# Patient Record
Sex: FEMALE | Race: WHITE | NOT HISPANIC OR LATINO | Employment: FULL TIME | URBAN - METROPOLITAN AREA
[De-identification: names, ages, dates, MRNs, and addresses within clinical notes are randomized per-mention and may not be internally consistent; named-entity substitution may affect disease eponyms.]

---

## 2017-02-27 ENCOUNTER — OFFICE VISIT - HEALTHEAST (OUTPATIENT)
Dept: FAMILY MEDICINE | Facility: CLINIC | Age: 22
End: 2017-02-27

## 2017-02-27 DIAGNOSIS — N39.0 UTI (URINARY TRACT INFECTION), UNCOMPLICATED: ICD-10-CM

## 2017-02-27 DIAGNOSIS — R30.0 DYSURIA: ICD-10-CM

## 2017-08-08 ENCOUNTER — COMMUNICATION - HEALTHEAST (OUTPATIENT)
Dept: SCHEDULING | Facility: CLINIC | Age: 22
End: 2017-08-08

## 2017-10-24 ENCOUNTER — OFFICE VISIT - HEALTHEAST (OUTPATIENT)
Dept: FAMILY MEDICINE | Facility: CLINIC | Age: 22
End: 2017-10-24

## 2017-10-24 DIAGNOSIS — Z00.00 ROUTINE GENERAL MEDICAL EXAMINATION AT A HEALTH CARE FACILITY: ICD-10-CM

## 2017-10-24 DIAGNOSIS — Z11.3 SCREENING FOR STD (SEXUALLY TRANSMITTED DISEASE): ICD-10-CM

## 2017-10-24 LAB — HIV 1+2 AB+HIV1 P24 AG SERPL QL IA: NEGATIVE

## 2017-10-24 ASSESSMENT — MIFFLIN-ST. JEOR: SCORE: 1337.81

## 2017-10-25 LAB — SYPHILIS RPR SCREEN - HISTORICAL: NORMAL

## 2017-10-30 ENCOUNTER — COMMUNICATION - HEALTHEAST (OUTPATIENT)
Dept: FAMILY MEDICINE | Facility: CLINIC | Age: 22
End: 2017-10-30

## 2017-12-26 ENCOUNTER — COMMUNICATION - HEALTHEAST (OUTPATIENT)
Dept: FAMILY MEDICINE | Facility: CLINIC | Age: 22
End: 2017-12-26

## 2018-02-20 ENCOUNTER — COMMUNICATION - HEALTHEAST (OUTPATIENT)
Dept: FAMILY MEDICINE | Facility: CLINIC | Age: 23
End: 2018-02-20

## 2018-08-15 ENCOUNTER — OFFICE VISIT - HEALTHEAST (OUTPATIENT)
Dept: FAMILY MEDICINE | Facility: CLINIC | Age: 23
End: 2018-08-15
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
## 2018-10-25 ENCOUNTER — OFFICE VISIT - HEALTHEAST (OUTPATIENT)
Dept: FAMILY MEDICINE | Facility: CLINIC | Age: 23
End: 2018-10-25

## 2018-10-25 DIAGNOSIS — Z00.00 ROUTINE GENERAL MEDICAL EXAMINATION AT A HEALTH CARE FACILITY: ICD-10-CM

## 2018-10-25 DIAGNOSIS — Z30.09 BIRTH CONTROL COUNSELING: ICD-10-CM

## 2018-10-25 DIAGNOSIS — Z11.3 SCREENING FOR STD (SEXUALLY TRANSMITTED DISEASE): ICD-10-CM

## 2018-10-25 DIAGNOSIS — D48.5 NEOPLASM OF UNCERTAIN BEHAVIOR OF SKIN: ICD-10-CM

## 2018-10-25 ASSESSMENT — MIFFLIN-ST. JEOR: SCORE: 1317.85

## 2018-10-26 ENCOUNTER — COMMUNICATION - HEALTHEAST (OUTPATIENT)
Dept: FAMILY MEDICINE | Facility: CLINIC | Age: 23
End: 2018-10-26

## 2018-10-26 LAB
C TRACH DNA SPEC QL PROBE+SIG AMP: NEGATIVE
N GONORRHOEA DNA SPEC QL NAA+PROBE: NEGATIVE

## 2018-11-15 ENCOUNTER — COMMUNICATION - HEALTHEAST (OUTPATIENT)
Dept: FAMILY MEDICINE | Facility: CLINIC | Age: 23
End: 2018-11-15

## 2018-12-18 ENCOUNTER — OFFICE VISIT - HEALTHEAST (OUTPATIENT)
Dept: FAMILY MEDICINE | Facility: CLINIC | Age: 23
End: 2018-12-18

## 2018-12-18 DIAGNOSIS — N89.8 VAGINAL DISCHARGE: ICD-10-CM

## 2018-12-18 DIAGNOSIS — N30.00 ACUTE CYSTITIS WITHOUT HEMATURIA: ICD-10-CM

## 2018-12-18 DIAGNOSIS — B96.89 BV (BACTERIAL VAGINOSIS): ICD-10-CM

## 2018-12-18 DIAGNOSIS — N76.0 BV (BACTERIAL VAGINOSIS): ICD-10-CM

## 2018-12-18 DIAGNOSIS — R30.0 DYSURIA: ICD-10-CM

## 2018-12-18 LAB
ALBUMIN UR-MCNC: ABNORMAL MG/DL
APPEARANCE UR: CLEAR
BACTERIA #/AREA URNS HPF: ABNORMAL HPF
BILIRUB UR QL STRIP: ABNORMAL
CLUE CELLS: ABNORMAL
COLOR UR AUTO: YELLOW
GLUCOSE UR STRIP-MCNC: NEGATIVE MG/DL
HGB UR QL STRIP: ABNORMAL
KETONES UR STRIP-MCNC: ABNORMAL MG/DL
LEUKOCYTE ESTERASE UR QL STRIP: ABNORMAL
NITRATE UR QL: NEGATIVE
PH UR STRIP: 6 [PH] (ref 5–8)
RBC #/AREA URNS AUTO: ABNORMAL HPF
SP GR UR STRIP: 1.02 (ref 1–1.03)
SQUAMOUS #/AREA URNS AUTO: >100 LPF
TRICHOMONAS, WET PREP: ABNORMAL
UROBILINOGEN UR STRIP-ACNC: ABNORMAL
WBC #/AREA URNS AUTO: ABNORMAL HPF
YEAST, WET PREP: ABNORMAL

## 2018-12-20 LAB — BACTERIA SPEC CULT: ABNORMAL

## 2019-11-22 ENCOUNTER — OFFICE VISIT - HEALTHEAST (OUTPATIENT)
Dept: FAMILY MEDICINE | Facility: CLINIC | Age: 24
End: 2019-11-22

## 2019-11-22 DIAGNOSIS — Z00.00 ROUTINE GENERAL MEDICAL EXAMINATION AT A HEALTH CARE FACILITY: ICD-10-CM

## 2019-11-22 DIAGNOSIS — Z30.09 BIRTH CONTROL COUNSELING: ICD-10-CM

## 2019-11-22 ASSESSMENT — MIFFLIN-ST. JEOR: SCORE: 1339.63

## 2020-10-10 ENCOUNTER — COMMUNICATION - HEALTHEAST (OUTPATIENT)
Dept: SCHEDULING | Facility: CLINIC | Age: 25
End: 2020-10-10

## 2020-10-10 DIAGNOSIS — Z30.09 BIRTH CONTROL COUNSELING: ICD-10-CM

## 2021-01-22 ENCOUNTER — OFFICE VISIT - HEALTHEAST (OUTPATIENT)
Dept: FAMILY MEDICINE | Facility: CLINIC | Age: 26
End: 2021-01-22

## 2021-01-22 DIAGNOSIS — Z00.00 ROUTINE GENERAL MEDICAL EXAMINATION AT A HEALTH CARE FACILITY: ICD-10-CM

## 2021-01-22 DIAGNOSIS — Z13.220 LIPID SCREENING: ICD-10-CM

## 2021-01-22 DIAGNOSIS — Z13.1 DIABETES MELLITUS SCREENING: ICD-10-CM

## 2021-01-22 DIAGNOSIS — Z30.09 BIRTH CONTROL COUNSELING: ICD-10-CM

## 2021-01-22 LAB
CHOLEST SERPL-MCNC: 143 MG/DL
FASTING STATUS PATIENT QL REPORTED: YES
FASTING STATUS PATIENT QL REPORTED: YES
GLUCOSE BLD-MCNC: 75 MG/DL (ref 70–125)
HDLC SERPL-MCNC: 59 MG/DL
HGB BLD-MCNC: 15.5 G/DL (ref 12–16)
LDLC SERPL CALC-MCNC: 74 MG/DL
TRIGL SERPL-MCNC: 50 MG/DL

## 2021-01-22 RX ORDER — NORGESTIMATE AND ETHINYL ESTRADIOL 7DAYSX3 28
1 KIT ORAL DAILY
Qty: 84 TABLET | Refills: 3 | Status: SHIPPED | OUTPATIENT
Start: 2021-01-22 | End: 2022-01-28

## 2021-01-22 ASSESSMENT — MIFFLIN-ST. JEOR: SCORE: 1315.06

## 2021-01-26 LAB
BKR LAB AP ABNORMAL BLEEDING: NO
BKR LAB AP BIRTH CONTROL/HORMONES: NORMAL
BKR LAB AP CERVICAL APPEARANCE: NORMAL
BKR LAB AP GYN ADEQUACY: NORMAL
BKR LAB AP GYN INTERPRETATION: NORMAL
BKR LAB AP HPV REFLEX: NORMAL
BKR LAB AP LMP: NORMAL
BKR LAB AP PATIENT STATUS: NORMAL
BKR LAB AP PREVIOUS ABNORMAL: NORMAL
BKR LAB AP PREVIOUS NORMAL: 2017
HIGH RISK?: NO
PATH REPORT.COMMENTS IMP SPEC: NORMAL
RESULT FLAG (HE HISTORICAL CONVERSION): NORMAL

## 2021-05-30 VITALS — WEIGHT: 127 LBS

## 2021-05-31 VITALS — WEIGHT: 128.1 LBS | BODY MASS INDEX: 20.59 KG/M2 | HEIGHT: 66 IN

## 2021-06-01 VITALS — WEIGHT: 122 LBS | BODY MASS INDEX: 19.69 KG/M2

## 2021-06-02 VITALS — WEIGHT: 123.7 LBS | HEIGHT: 66 IN | BODY MASS INDEX: 19.88 KG/M2

## 2021-06-02 VITALS — WEIGHT: 125.8 LBS | BODY MASS INDEX: 20.3 KG/M2

## 2021-06-03 NOTE — PROGRESS NOTES
Assessment and Plan:    1. Routine general medical examination at a health care facility  Discussed consuming a healthy diet and exercising.  She is up-to-date on vaccinations.  She declines influenza vaccine.  She declines fasting labs.    2. Birth control counseling  Renewed OCP.  Educated on its indications and side effects including increased risk of blood clots.  She will follow-up in 1 year for Pap smear.  She is content with the plan.  She declines STD screening.  - norgestimate-ethinyl estradiol (ORTHO TRI-CYCLEN) 0.18/0.215/0.25 mg-35 mcg (28) tablet; Take 1 tablet by mouth daily.  Dispense: 84 tablet; Refill: 3      Subjective:     Iman is a 24 y.o. female presenting to the clinic for a female physical.     LMP: one week ago   Hx of abnormal pap smear: none   Last pap smear: 10/24/17 normal   Perform self-breast exams: none   Vaginal discharge or irritation: none  Sexually active: not currently, single   Contraception: ocp  Concerns for STDs: none  Previous pregnancies:none     She is feeling well today and has no other concerns.    Review of systems:  I performed a 10 point review of systems.  All pertinent positives and negatives are noted in the HPI. All others are negative.     No Known Allergies    Current Outpatient Medications on File Prior to Visit   Medication Sig Dispense Refill     norgestimate-ethinyl estradiol (ORTHO TRI-CYCLEN) 0.18/0.215/0.25 mg-35 mcg (28) Tab tablet Take 1 tablet by mouth daily. 84 tablet 3     No current facility-administered medications on file prior to visit.        Social History     Socioeconomic History     Marital status: Single     Spouse name: Not on file     Number of children: Not on file     Years of education: Not on file     Highest education level: Not on file   Occupational History     Not on file   Social Needs     Financial resource strain: Not on file     Food insecurity:     Worry: Not on file     Inability: Not on file     Transportation needs:      Medical: Not on file     Non-medical: Not on file   Tobacco Use     Smoking status: Never Smoker     Smokeless tobacco: Never Used   Substance and Sexual Activity     Alcohol use: Yes     Alcohol/week: 1.0 standard drinks     Types: 1 Glasses of wine per week     Drug use: No     Sexual activity: Not on file     Comment: single   Lifestyle     Physical activity:     Days per week: Not on file     Minutes per session: Not on file     Stress: Not on file   Relationships     Social connections:     Talks on phone: Not on file     Gets together: Not on file     Attends Restorationism service: Not on file     Active member of club or organization: Not on file     Attends meetings of clubs or organizations: Not on file     Relationship status: Not on file     Intimate partner violence:     Fear of current or ex partner: Not on file     Emotionally abused: Not on file     Physically abused: Not on file     Forced sexual activity: Not on file   Other Topics Concern     Not on file   Social History Narrative     Not on file       No past medical history on file.    Family History   Problem Relation Age of Onset     No Medical Problems Mother      Diabetes Father      Diabetes Sister      Seizures Brother      Depression Sister      Anxiety disorder Sister        Past Surgical History:   Procedure Laterality Date     APPENDECTOMY         Objective:     Vitals:    11/22/19 0744   BP: 100/70   Pulse: 77   SpO2: 98%       Patient is alert, no obvious distress.   Skin: Warm, dry.  No rashes or lesions. Skin turgor rapid return.   HEENT:  Eyes normal.  Ears normal.  Nose patent, mucosa pink.  Oropharynx mucosa pink, no lesions or tonsil enlargement.   Neck:  Supple, without lymphadenopathy, bruits, JVD. Thyroid normal texture and size.    Lungs:  Clear to auscultation.  No wheezing, rales noted.  Respirations even and unlabored.   Heart:  Regular rate and rhythm.  No murmurs.   Breasts:  Normal.  No surrounding adenopathy.   Abdomen:  Soft, nontender.  No organomegaly.  Bowel sounds normoactive.  No guarding or masses noted.   :  deferred  Musculoskeletal:  Full ROM of extremities.  Muscle strength equal +5/5.   Neurological:  Cranial nerves 2-12 intact.

## 2021-06-04 VITALS
BODY MASS INDEX: 21.99 KG/M2 | SYSTOLIC BLOOD PRESSURE: 100 MMHG | DIASTOLIC BLOOD PRESSURE: 70 MMHG | OXYGEN SATURATION: 98 % | HEART RATE: 77 BPM | HEIGHT: 65 IN | WEIGHT: 132 LBS

## 2021-06-05 VITALS
OXYGEN SATURATION: 99 % | HEART RATE: 87 BPM | BODY MASS INDEX: 21.34 KG/M2 | WEIGHT: 128.1 LBS | HEIGHT: 65 IN | SYSTOLIC BLOOD PRESSURE: 110 MMHG | DIASTOLIC BLOOD PRESSURE: 80 MMHG

## 2021-06-09 NOTE — PROGRESS NOTES
SUBJECTIVE:    CORINNE Siddiqui is a 21 y.o. female presents with 7 weeks complaint of urinary frequency, urgency and dysuria. Denies hematuria, nausea and vomiting. Without flank pain, fever, chills, or abnormal vaginal discharge, itching or bleeding. No rash, lesions or redness noted in the genital area. Current sexually active with 1 partner. She is currently on birth control, does not use condoms. LMP roughly 1 month ago, due to get her next period this week. Declines testing for STIs, no current concerns. Does not want to do a vaginal/pelvic exam today. No previous UTI or yeast infection. No history of pyelonephritis or kidney stones.  Patient has not tried any OTC treatment for her symptoms.    No past medical history on file.    Current Medications:  Current Outpatient Prescriptions on File Prior to Visit   Medication Sig Dispense Refill     norgestimate-ethinyl estradiol (ORTHO TRI-CYCLEN) 0.18/0.215/0.25 mg-35 mcg (28) Tab tablet Take 1 tablet by mouth daily.       No current facility-administered medications on file prior to visit.        Allergies:  No Known Allergies    OBJECTIVE:  Visit Vitals     /72     Pulse 77     Temp 98.6  F (37  C) (Oral)     Resp 14     Wt 127 lb (57.6 kg)     LMP 01/31/2017 (Exact Date)     SpO2 100%     Breastfeeding No           OBJECTIVE:  Physical exam reveals a pleasant 21 y.o. female   Appears healthy, alert and cooperative  Lungs: Chest is clear, no wheezing or rales. Symmetric air entry throughout both lung fields.  Cardiac: regular rate and rhythm, no murmur rub or gallop  Abdomen: soft, non-distended, mild suprapubic tenderness, no hepatosplenomegaly. Negative bilateral CVAT.      Results for orders placed or performed in visit on 02/27/17   Urinalysis-UC if Indicated   Result Value Ref Range    Color, UA Yellow Colorless, Yellow, Straw, Light Yellow    Clarity, UA Cloudy (!) Clear    Glucose, UA Negative Negative    Bilirubin, UA Negative Negative     Ketones, UA Negative Negative    Specific Gravity, UA 1.025 1.002 - 1.030    Blood, UA Small (!) Negative    pH, UA 7.0 4.5 - 8.0    Protein,  mg/dL (!) Negative mg/dL    Urobilinogen, UA 4.0 E.U./dL (!) 0.2 E.U./dL, 1.0 E.U./dL    Nitrite, UA Negative Negative    Leukocytes, UA Small (!) Negative    Bacteria, UA Few (!) None Seen hpf    RBC, UA 0-2 None Seen, 0-2 hpf    WBC, UA  (!) None Seen, 0-5 hpf    Squam Epithel, UA 10-25 (!) None Seen, 0-5 lpf       ASSESSMENT:  1. UTI (urinary tract infection), uncomplicated     2. Dysuria  Urinalysis-UC if Indicated    Pregnancy (Beta-hCG, Qual), Urine    Culture, Urine     PLAN:   I reviewed exam findings with patient. UA shows bacteria and elevating WBC, supportive of UTI. Patient started on Bactrim, which was discussed with the patient. UC results pending, will notify patient with results and adjust antibiotics if needed. Advised adequate rest and hydration with a minimum of 2-3 liters non-caffienated beverage per day. Advised OTC Uristat or Azo for symptom relief for the first 48 hours. Follow-up with primary for persistence or worsening of symptoms. Advised if develops fever, chills, nausea, vomiting, or flank pain, go to the ED. Patient verbalized understanding and agrees with plan of care.     -Patient instructions given.

## 2021-06-12 NOTE — TELEPHONE ENCOUNTER
Gale says insurance has changed, dropped in July when turned 25. Would like birthcontrol refill sent to different pharmacy. Has one refill left.

## 2021-06-13 NOTE — PROGRESS NOTES
Assessment and Plan:    1. Routine general medical examination at a health care facility  Discussed consuming a healthy diet and exercising.  Discussed importance of routine sunscreen.  Recommend taking a daily multivitamin.  She states she is up-to-date on vaccinations.  She declines influenza vaccine today.  She is taking oral contraceptive and does not need a refill today.  She will notify me for future refill needs.  - Gynecologic Cytology (PAP Smear)    2. Screening for STD (sexually transmitted disease)  Discussed safe sex practices.  Will notify patient of results.  She is content with the plan.  - Chlamydia trachomatis & Neisseria gonorrhoeae, Amplified Detection  - HIV Antigen/Antibody Screening [place]  - Syphilis Screen, [place]      Subjective:     Iman is a 22 y.o. female presenting to the clinic for a female physical.     LMP: last week, regular   Hx of abnormal pap smear: none   Last pap smear: 2-3 years   Perform self-breast exams: none   Vaginal discharge or irritation: none  Sexually active: yes, single   Contraception: ocp  Concerns for STDs: yes, would like to be checked  Previous pregnancies:none     She is feeling well today and has no other concerns.    Review of systems:  I performed a 10 point review of systems.  All pertinent positives and negatives are noted in the HPI. All others are negative.     No Known Allergies    Current Outpatient Prescriptions on File Prior to Visit   Medication Sig Dispense Refill     norgestimate-ethinyl estradiol (ORTHO TRI-CYCLEN) 0.18/0.215/0.25 mg-35 mcg (28) Tab tablet Take 1 tablet by mouth daily.       No current facility-administered medications on file prior to visit.        Social History     Social History     Marital status: Single     Spouse name: N/A     Number of children: N/A     Years of education: N/A     Occupational History     Not on file.     Social History Main Topics     Smoking status: Never Smoker     Smokeless tobacco: Never Used      Alcohol use No     Drug use: No     Sexual activity: Not on file     Other Topics Concern     Not on file     Social History Narrative       No past medical history on file.    No family history on file.    No past surgical history on file.    Objective:     Vitals:    10/24/17 1617   BP: 120/62   Pulse: 86   SpO2: 100%       Patient is alert, no obvious distress.   Skin: Warm, dry.  No rashes or lesions. Skin turgor rapid return.   HEENT:  Eyes normal.  Ears normal.  Nose patent, mucosa pink.  Oropharynx mucosa pink, no lesions or tonsil enlargement.   Neck:  Supple, without lymphadenopathy, bruits, JVD. Thyroid normal texture and size.    Lungs:  Clear to auscultation.  No wheezing, rales noted.  Respirations even and unlabored.   Heart:  Regular rate and rhythm.  No murmurs.   Breasts:  Normal.  No surrounding adenopathy.   Abdomen: Soft, nontender.  No organomegaly.  Bowel sounds normoactive.  No guarding or masses noted.   :  External genitalia normal.  Normal vaginal mucosa.  Cervix no lesions or cervical motion tenderness. Uterus normal size, no masses.  Adnexa no masses palpated bilaterally.   Musculoskeletal:  Full ROM of extremities.  Muscle strength equal +5/5.   Neurological:  Cranial nerves 2-12 intact.

## 2021-06-14 NOTE — PROGRESS NOTES
Assessment and Plan:    1. Routine general medical examination at a health care facility  Recommend consuming a healthy diet and exercising.  She declines hepatitis C screening.  She declines influenza vaccine.  - Hemoglobin  - Gynecologic Cytology (PAP Smear)    2. Birth control counseling  Renewed OCP.  Educated on its indications and side effects including increased risk of blood clots.  - norgestimate-ethinyl estradioL (ORTHO TRI-CYCLEN) 0.18/0.215/0.25 mg-35 mcg (28) tablet; Take 1 tablet by mouth daily.  Dispense: 84 tablet; Refill: 3    3. Diabetes mellitus screening  - Glucose    4. Lipid screening  - Lipid [place]    I enjoyed my visit with Iman today and look forward to seeing her as needed in the future.    Subjective:     Iman is a 25 y.o. female presenting to the clinic for a female physical.       LMP: one week ago   Hx of abnormal pap smear: none   Last pap smear: 10/24/17 normal   Perform self-breast exams: none   Vaginal discharge or irritation: none  Sexually active: not currently, single   Contraception: ocp  Concerns for STDs: none  Previous pregnancies:none     Patient is feeling well today and has no other concerns.    Review of systems:  I performed a 10 point review of systems.  All pertinent positives and negatives are noted in the HPI. All others are negative.     No Known Allergies    Current Outpatient Medications on File Prior to Visit   Medication Sig Dispense Refill     [DISCONTINUED] norgestimate-ethinyl estradioL (ORTHO TRI-CYCLEN) 0.18/0.215/0.25 mg-35 mcg (28) tablet Take 1 tablet by mouth daily. 84 tablet 0     No current facility-administered medications on file prior to visit.        Social History     Socioeconomic History     Marital status: Single     Spouse name: Not on file     Number of children: Not on file     Years of education: Not on file     Highest education level: Not on file   Occupational History     Not on file   Social Needs     Financial resource  strain: Not on file     Food insecurity     Worry: Not on file     Inability: Not on file     Transportation needs     Medical: Not on file     Non-medical: Not on file   Tobacco Use     Smoking status: Never Smoker     Smokeless tobacco: Never Used   Substance and Sexual Activity     Alcohol use: Yes     Alcohol/week: 1.0 standard drinks     Types: 1 Glasses of wine per week     Drug use: No     Sexual activity: Not on file     Comment: single   Lifestyle     Physical activity     Days per week: Not on file     Minutes per session: Not on file     Stress: Not on file   Relationships     Social connections     Talks on phone: Not on file     Gets together: Not on file     Attends Adventism service: Not on file     Active member of club or organization: Not on file     Attends meetings of clubs or organizations: Not on file     Relationship status: Not on file     Intimate partner violence     Fear of current or ex partner: Not on file     Emotionally abused: Not on file     Physically abused: Not on file     Forced sexual activity: Not on file   Other Topics Concern     Not on file   Social History Narrative     Not on file       No past medical history on file.    Family History   Problem Relation Age of Onset     No Medical Problems Mother      Diabetes Father      Diabetes Sister      Seizures Brother      Depression Sister      Anxiety disorder Sister        Past Surgical History:   Procedure Laterality Date     APPENDECTOMY         Objective:     Vitals:    01/22/21 1454   BP: 110/80   Pulse: 87   SpO2: 99%       Patient is alert, no obvious distress.   Skin: Warm, dry.  No rashes or lesions. Skin turgor rapid return.   HEENT:  Eyes normal.  Ears normal.  Nose patent, mucosa pink.  Oropharynx mucosa pink, no lesions or tonsil enlargement.   Neck:  Supple, without lymphadenopathy, bruits, JVD. Thyroid normal texture and size.    Lungs:  Clear to auscultation.  No wheezing, rales noted.  Respirations even and  unlabored.   Heart:  Regular rate and rhythm.  No murmurs.   Breasts:  Normal.  No surrounding adenopathy.   Abdomen: Soft, nontender.  No organomegaly.  Bowel sounds normoactive.  No guarding or masses noted.   :  External genitalia normal.  Normal vaginal mucosa.  Cervix no lesions or cervical motion tenderness.   Musculoskeletal:  Full ROM of extremities.  Muscle strength equal +5/5.   Neurological:  Cranial nerves 2-12 intact.

## 2021-06-16 PROBLEM — Z78.9 USES BIRTH CONTROL: Status: ACTIVE | Noted: 2018-02-20

## 2021-06-19 NOTE — PROGRESS NOTES
Name: Iman Siddiqui  Age: 23 y.o.  Gender: female  : 1995  Date of Encounter: 8/15/2018      HPI:  Iman Siddiqui is a 23 y.o.  female who presents to the clinic with bite to the left hand.  She reports she was bit by her boyfriends cat last night.  She cleaned the wound applied topical antibiotic and a Band-Aid.  This morning she woke up she noted some swelling and redness to the area.  She comes in today for evaluation.  Denies fever, chills, headache, body aches nausea, vomiting.  Cat is well known to patient and it is an indoor cat, up-to-date on its vaccinations.  Patient is up-to-date on her tetanus vaccinations.  Last tetanus in .    Current Medication:   Medications reviewed and updated.    Current Outpatient Prescriptions:      norgestimate-ethinyl estradiol (ORTHO TRI-CYCLEN) 0.18/0.215/0.25 mg-35 mcg (28) Tab tablet, Take 1 tablet by mouth daily., Disp: 84 tablet, Rfl: 1    Past Med / Surg History:  No past medical history on file.  Past Surgical History:   Procedure Laterality Date     APPENDECTOMY         Fam / Soc History:  Family History   Problem Relation Age of Onset     No Medical Problems Mother      Diabetes Father      Diabetes Sister      Seizures Brother      Depression Sister      Anxiety disorder Sister      Social History     Social History     Marital status: Single     Spouse name: N/A     Number of children: N/A     Years of education: N/A     Occupational History     Not on file.     Social History Main Topics     Smoking status: Never Smoker     Smokeless tobacco: Never Used     Alcohol use 0.6 oz/week     1 Glasses of wine per week     Drug use: No     Sexual activity: Not on file      Comment: single     Other Topics Concern     Not on file     Social History Narrative       ROS:  14 point review of systems unremarkable except as mentioned in HPI    Objective:  Vitals: /60  Pulse 89  Temp 98.8  F (37.1  C) (Oral)   Resp 12  Wt 122 lb (55.3 kg)  SpO2 99%   Breastfeeding? No  BMI 19.69 kg/m2    Gen: Alert, awake, well appearing  HEENT: NC, AT,   Heart: Regular rate and rhythm; normal S1 and S2; no murmurs, gallops, or rubs.  Peripheral Vessels: Normal pulses and perfusion.  Lungs: Unlabored respirations; symmetric chest expansion; clear breath sounds.  Extremities: Hand By consisting of several puncture marks of the left thumb and webspace between thumb and second digit.  Area of erythema and slight edema extending over posterior aspect of hand approximately 5 cm in diameter.  No redness or erythema in the palmar aspect hand.  Patient able to make a fist with minimal discomfort.  A marlo was made at the edge of erythema for patient to monitor.  Peripheral pulses are full with good capillary refill.  Sensation intact to light touch on fingers of left hand.  Mental Status: Alert, oriented, in no distress. Mood and affect appropriate.      Pertinent results / imaging:  none    Assessment: Cat bite    Plan: Advised warm soaks, elevation, topical antibiotic and Band-Aid as needed.   prescribed Augmentin twice daily for 10 days to take with food.  Reviewed possible side effects of medication and recommended use of probiotic if needed.  Discussed use of over-the-counter ibuprofen or Tylenol for discomfort as needed.  Reviewed expected course, duration of symptoms and reasons to return for repeat evaluation.  Patient voiced understanding and was in agreement with plan.      Denisse Yang MD  8/15/2018

## 2021-06-21 NOTE — PROGRESS NOTES
Assessment and Plan:    1. Routine general medical examination at a health care facility  Discussed consuming a healthy diet and exercising.  Discussed importance routine sunscreen.  Recommend taking a daily multivitamin.  She is up-to-date on vaccinations.  She declines influenza vaccine.    2. Screening for STD (sexually transmitted disease)  Discussed safe sex practices.  Will notify patient of results.  - Chlamydia trachomatis & Neisseria gonorrhoeae, Amplified Detection    3. Birth control counseling  Renewed OCP. Educated on its indications and side effects including increased risk of blood clots.   - norgestimate-ethinyl estradiol (ORTHO TRI-CYCLEN) 0.18/0.215/0.25 mg-35 mcg (28) Tab tablet; Take 1 tablet by mouth daily.  Dispense: 84 tablet; Refill: 3    4. Neoplasm of uncertain behavior of skin  Will refer to dermatology for further evaluation and possible excision.  She is content with the plan.  - Ambulatory referral to Dermatology      Subjective:     Iman is a 23 y.o. female presenting to the clinic for a female physical.     LMP: two weeks ago, regular once/month   Hx of abnormal pap smear: none   Last pap smear: 10/24/17 normal   Perform self-breast exams: none   Vaginal discharge or irritation: none  Sexually active: yes, single   Contraception: ocp  Concerns for STDs: none  Previous pregnancies:none     She has a skin lesion present within her right axilla.  She has had this for as long as she can remember.  It has not changed in size.  She denies personal or family history of skin cancer.  She occasionally wears sunscreen.  She is feeling well today and has no other concerns.     Review of systems:  I performed a 10 point review of systems.  All pertinent positives and negatives are noted in the HPI. All others are negative.     No Known Allergies    Current Outpatient Prescriptions on File Prior to Visit   Medication Sig Dispense Refill     norgestimate-ethinyl estradiol (ORTHO TRI-CYCLEN)  0.18/0.215/0.25 mg-35 mcg (28) Tab tablet Take 1 tablet by mouth daily. 84 tablet 1     No current facility-administered medications on file prior to visit.        Social History     Social History     Marital status: Single     Spouse name: N/A     Number of children: N/A     Years of education: N/A     Occupational History     Not on file.     Social History Main Topics     Smoking status: Never Smoker     Smokeless tobacco: Never Used     Alcohol use 0.6 oz/week     1 Glasses of wine per week     Drug use: No     Sexual activity: Not on file      Comment: single     Other Topics Concern     Not on file     Social History Narrative       No past medical history on file.    Family History   Problem Relation Age of Onset     No Medical Problems Mother      Diabetes Father      Diabetes Sister      Seizures Brother      Depression Sister      Anxiety disorder Sister        Past Surgical History:   Procedure Laterality Date     APPENDECTOMY         Objective:     Vitals:    10/25/18 1406   BP: 96/58   Pulse: 88       Patient is alert, no obvious distress.   Skin: Warm, dry.  She has a 5-6 mm dark brown slightly raised nevus present within her right axilla.  It has irregular borders with multiple color variations.   HEENT:  Eyes normal.  Ears normal.  Nose patent, mucosa pink.  Oropharynx mucosa pink, no lesions or tonsil enlargement.   Neck:  Supple, without lymphadenopathy, bruits, JVD. Thyroid normal texture and size.    Lungs:  Clear to auscultation.  No wheezing, rales noted.  Respirations even and unlabored.   Heart:  Regular rate and rhythm.  No murmurs.   Breasts:  Normal.  No surrounding adenopathy.   Abdomen: Soft, nontender.  No organomegaly.  Bowel sounds normoactive.  No guarding or masses noted.   :  deferred  Musculoskeletal:  Full ROM of extremities.  Muscle strength equal +5/5.   Neurological:  Cranial nerves 2-12 intact.

## 2021-06-22 NOTE — PROGRESS NOTES
Assessment:     1. Dysuria  Urinalysis-UC if Indicated    Culture, Urine   2. Vaginal discharge  Wet Prep, Vaginal   3. BV (bacterial vaginosis)  metroNIDAZOLE (FLAGYL) 500 MG tablet   4. Acute cystitis without hematuria  sulfamethoxazole-trimethoprim (BACTRIM DS) 800-160 mg per tablet     Results for orders placed or performed in visit on 12/18/18   Wet Prep, Vaginal   Result Value Ref Range    Yeast Result No yeast seen No yeast seen    Trichomonas No Trichomonas seen No Trichomonas seen    Clue Cells, Wet Prep Clue cells seen (!) No Clue cells seen   Urinalysis-UC if Indicated   Result Value Ref Range    Color, UA Yellow Colorless, Yellow, Straw, Light Yellow    Clarity, UA Clear Clear    Glucose, UA Negative Negative    Bilirubin, UA Small (!) Negative    Ketones, UA Trace (!) Negative    Specific Gravity, UA 1.025 1.005 - 1.030    Blood, UA Moderate (!) Negative    pH, UA 6.0 5.0 - 8.0    Protein,  mg/dL (!) Negative mg/dL    Urobilinogen, UA 2.0 E.U./dL (!) 0.2 E.U./dL, 1.0 E.U./dL    Nitrite, UA Negative Negative    Leukocytes, UA Moderate (!) Negative    Bacteria, UA Moderate (!) None Seen hpf    RBC, UA 10-25 (!) None Seen, 0-2 hpf    WBC, UA 25-50 (!) None Seen, 0-5 hpf    Squam Epithel, UA >100 (!) None Seen, 0-5 lpf            Plan:     Differential diagnosis include but not limited to dysuria, frequency, urgency, UTI, pyelonephritis, bacterial vaginosis, vaginal discharge, or yeast infection.  Discussed with the patient on exam negative for pyelonephritis, no flank pain noted with palpation.  Wet prep done, positive for bacterial vaginosis, will treat with metronidazole 500 mg 3 times daily times 7 days.  Urinalysis done, patient with moderate leukocytes, will treat for UTI with Bactrim twice daily times 10 days.  Will send urine culture, if negative will call patient to change the treatment plan according to sensitivity.    I discussed red flag symptoms, return precautions to clinic/ER and follow  up care with patient/parent.  Expected clinical course, symptomatic cares advised. Questions answered. Patient/parent amenable with plan.     Subjective:       23 y.o. female presents for evaluation of possible yeast infection UTI.  Patient reports that she has been having the symptoms 1-2 days, she has some discharge not sure if it has been ordered.  Currently she is not sexually active.  She also admits to dysuria, urgency, and frequency.  Last time she was diagnosed with UTI was about 1 year ago, no concern with STI.  She denies nausea, vomiting, diarrhea, fever, or chills.  She admits to discomfort with urination.  She denies flank pain.    The following portions of the patient's history were reviewed and updated as appropriate: allergies, current medications, past family history, past medical history, past social history, past surgical history and problem list.    Review of Systems  A 12 point comprehensive review of systems was negative except as noted.      Objective:      BP 98/70 (Patient Site: Right Arm, Patient Position: Sitting, Cuff Size: Adult Regular)   Pulse 89   Temp 98  F (36.7  C) (Oral)   Wt 125 lb 12.8 oz (57.1 kg)   LMP 12/12/2018   SpO2 99%   Breastfeeding? No   BMI 20.30 kg/m    General appearance: alert, appears stated age, cooperative and mild distress  Head: Normocephalic, without obvious abnormality, atraumatic  Back: symmetric, no curvature. ROM normal. No CVA tenderness., Negative for flank pain  Lungs: clear to auscultation bilaterally  Heart: regular rate and rhythm, S1, S2 normal, no murmur, click, rub or gallop  Pelvic: cervix normal in appearance, external genitalia normal, no adnexal masses or tenderness and positive findings: vaginal discharge:  copious, bloody, mucoid and odorless  Extremities: extremities normal, atraumatic, no cyanosis or edema  Pulses: 2+ and symmetric  Skin: Skin color, texture, turgor normal. No rashes or lesions  Neurologic: Grossly normal     This  note has been dictated using voice recognition software. Any grammatical or context distortions are unintentional and inherent to the software

## 2021-06-27 ENCOUNTER — HEALTH MAINTENANCE LETTER (OUTPATIENT)
Age: 26
End: 2021-06-27

## 2021-10-17 ENCOUNTER — HEALTH MAINTENANCE LETTER (OUTPATIENT)
Age: 26
End: 2021-10-17

## 2021-11-22 ENCOUNTER — OFFICE VISIT (OUTPATIENT)
Dept: FAMILY MEDICINE | Facility: CLINIC | Age: 26
End: 2021-11-22
Payer: COMMERCIAL

## 2021-11-22 VITALS
BODY MASS INDEX: 22.26 KG/M2 | RESPIRATION RATE: 17 BRPM | HEART RATE: 107 BPM | DIASTOLIC BLOOD PRESSURE: 60 MMHG | OXYGEN SATURATION: 98 % | TEMPERATURE: 97.8 F | WEIGHT: 132 LBS | SYSTOLIC BLOOD PRESSURE: 98 MMHG

## 2021-11-22 DIAGNOSIS — B30.9 ACUTE VIRAL CONJUNCTIVITIS OF LEFT EYE: ICD-10-CM

## 2021-11-22 DIAGNOSIS — Z20.822 EXPOSURE TO 2019 NOVEL CORONAVIRUS: Primary | ICD-10-CM

## 2021-11-22 PROCEDURE — 99213 OFFICE O/P EST LOW 20 MIN: CPT | Performed by: PHYSICIAN ASSISTANT

## 2021-11-22 PROCEDURE — U0005 INFEC AGEN DETEC AMPLI PROBE: HCPCS | Performed by: PHYSICIAN ASSISTANT

## 2021-11-22 PROCEDURE — U0003 INFECTIOUS AGENT DETECTION BY NUCLEIC ACID (DNA OR RNA); SEVERE ACUTE RESPIRATORY SYNDROME CORONAVIRUS 2 (SARS-COV-2) (CORONAVIRUS DISEASE [COVID-19]), AMPLIFIED PROBE TECHNIQUE, MAKING USE OF HIGH THROUGHPUT TECHNOLOGIES AS DESCRIBED BY CMS-2020-01-R: HCPCS | Performed by: PHYSICIAN ASSISTANT

## 2021-11-22 RX ORDER — TOBRAMYCIN 3 MG/ML
1-2 SOLUTION/ DROPS OPHTHALMIC 4 TIMES DAILY
Qty: 5 ML | Refills: 0 | Status: SHIPPED | OUTPATIENT
Start: 2021-11-22 | End: 2022-01-28

## 2021-11-22 ASSESSMENT — ENCOUNTER SYMPTOMS
PHOTOPHOBIA: 0
EYE ITCHING: 1
EYE DISCHARGE: 1
EYE REDNESS: 1
EYE PAIN: 0

## 2021-11-22 NOTE — PROGRESS NOTES
Patient presents with:  Eye Problem: LEFT EYE IRRITATED, DISCHARGE AND HURTS THIS MORNING.      Clinical Decision Making: Patient experiencing eye discharge that started today.  Physical exam is relatively benign.  Suspect either viral or bacterial conjunctivitis.  Recommend watchful waiting over the next 24 hours.  If discharge becomes more copious or purulent she will begin eyedrops.  If symptoms remain the same she will continue to monitor.      ICD-10-CM    1. Exposure to 2019 novel coronavirus  Z20.822 Asymptomatic COVID-19 Virus (Coronavirus) by PCR   2. Acute viral conjunctivitis of left eye  B30.9 tobramycin (TOBREX) 0.3 % ophthalmic solution       Patient Instructions   1) Warm compress with a clean wet washcloth to affected eye.  2) Wait over the next 24 hours; If the eye begins to have a thick discharge fill eye drop prescription and administer 1 drop to the affected eye four times a day. If the eye watering stays thin continue to monitor.   3) Practice good hand hygiene. Avoid sharing linens such as pillowcases, towels, or wash clothes. Wash these items on hot to prevent spreading.  4) Follow up if symptoms worsen or if not getting better within 4 days.         HPI:  Iman Siddiqui is a 26 year old female who presents today complaining of left eye discomfort, redness, and discharge that started this morning.  Patient does not wear contact lenses.  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  She denies any significant photophobia or vision changes.  Her eye discharge has mostly been thin today.    History obtained from the patient.    Problem List:  2018-02: Uses birth control      No past medical history on file.    Social History     Tobacco Use     Smoking status: Never Smoker     Smokeless tobacco: Never Used   Substance Use Topics     Alcohol use: Yes     Alcohol/week: 1.0 standard drink       Review of Systems   Eyes: Positive for discharge, redness and itching. Negative for photophobia,  pain and visual disturbance.       Vitals:    11/22/21 1413   BP: 98/60   Pulse: 107   Resp: 17   Temp: 97.8  F (36.6  C)   SpO2: 98%   Weight: 59.9 kg (132 lb)       Physical Exam  Vitals and nursing note reviewed.   Constitutional:       General: She is not in acute distress.     Appearance: She is not toxic-appearing or diaphoretic.   HENT:      Head: Normocephalic and atraumatic.      Right Ear: External ear normal.      Left Ear: External ear normal.   Eyes:      General: Lids are normal.      Conjunctiva/sclera:      Right eye: Right conjunctiva is not injected. No exudate.     Left eye: Left conjunctiva is injected. No exudate.     Pupils: Pupils are equal, round, and reactive to light.   Pulmonary:      Effort: Pulmonary effort is normal. No respiratory distress.   Neurological:      Mental Status: She is alert.   Psychiatric:         Mood and Affect: Mood normal.         Behavior: Behavior normal.         Thought Content: Thought content normal.         Judgment: Judgment normal.       At the end of the encounter, I discussed results, diagnosis, medications. Discussed red flags for immediate return to clinic/ER, as well as indications for follow up if no improvement. Patient understood and agreed to plan. Patient was stable for discharge.

## 2021-11-22 NOTE — PATIENT INSTRUCTIONS
1) Warm compress with a clean wet washcloth to affected eye.  2) Wait over the next 24 hours; If the eye begins to have a thick discharge fill eye drop prescription and administer 1 drop to the affected eye four times a day. If the eye watering stays thin continue to monitor.   3) Practice good hand hygiene. Avoid sharing linens such as pillowcases, towels, or wash clothes. Wash these items on hot to prevent spreading.  4) Follow up if symptoms worsen or if not getting better within 4 days.

## 2021-11-23 LAB — SARS-COV-2 RNA RESP QL NAA+PROBE: POSITIVE

## 2022-01-28 ENCOUNTER — OFFICE VISIT (OUTPATIENT)
Dept: FAMILY MEDICINE | Facility: CLINIC | Age: 27
End: 2022-01-28
Payer: COMMERCIAL

## 2022-01-28 VITALS
HEIGHT: 65 IN | DIASTOLIC BLOOD PRESSURE: 60 MMHG | SYSTOLIC BLOOD PRESSURE: 102 MMHG | WEIGHT: 134.7 LBS | HEART RATE: 60 BPM | BODY MASS INDEX: 22.44 KG/M2

## 2022-01-28 DIAGNOSIS — Z11.59 NEED FOR HEPATITIS C SCREENING TEST: ICD-10-CM

## 2022-01-28 DIAGNOSIS — Z13.1 DIABETES MELLITUS SCREENING: ICD-10-CM

## 2022-01-28 DIAGNOSIS — Z13.220 LIPID SCREENING: ICD-10-CM

## 2022-01-28 DIAGNOSIS — Z30.09 BIRTH CONTROL COUNSELING: ICD-10-CM

## 2022-01-28 DIAGNOSIS — Z00.00 ENCOUNTER FOR ROUTINE HISTORY AND PHYSICAL EXAM IN FEMALE: Primary | ICD-10-CM

## 2022-01-28 LAB
CHOLEST SERPL-MCNC: 171 MG/DL
FASTING STATUS PATIENT QL REPORTED: YES
FASTING STATUS PATIENT QL REPORTED: YES
GLUCOSE BLD-MCNC: 86 MG/DL (ref 70–125)
HDLC SERPL-MCNC: 81 MG/DL
HGB BLD-MCNC: 15.4 G/DL (ref 11.7–15.7)
LDLC SERPL CALC-MCNC: 76 MG/DL
TRIGL SERPL-MCNC: 72 MG/DL

## 2022-01-28 PROCEDURE — 86803 HEPATITIS C AB TEST: CPT | Performed by: NURSE PRACTITIONER

## 2022-01-28 PROCEDURE — 85018 HEMOGLOBIN: CPT | Performed by: NURSE PRACTITIONER

## 2022-01-28 PROCEDURE — 99395 PREV VISIT EST AGE 18-39: CPT | Performed by: NURSE PRACTITIONER

## 2022-01-28 PROCEDURE — 36415 COLL VENOUS BLD VENIPUNCTURE: CPT | Performed by: NURSE PRACTITIONER

## 2022-01-28 PROCEDURE — 80061 LIPID PANEL: CPT | Performed by: NURSE PRACTITIONER

## 2022-01-28 PROCEDURE — 82947 ASSAY GLUCOSE BLOOD QUANT: CPT | Performed by: NURSE PRACTITIONER

## 2022-01-28 RX ORDER — NORGESTIMATE AND ETHINYL ESTRADIOL 7DAYSX3 28
1 KIT ORAL DAILY
Qty: 84 TABLET | Refills: 3 | Status: SHIPPED | OUTPATIENT
Start: 2022-01-28 | End: 2023-02-15

## 2022-01-28 ASSESSMENT — MIFFLIN-ST. JEOR: SCORE: 1342.91

## 2022-01-28 NOTE — PROGRESS NOTES
Assessment and Plan:    Encounter for routine history and physical exam in female  Recommend consuming a healthy diet and exercising.  She declines Covid booster and influenza vaccines.  - Hemoglobin    Diabetes mellitus screening  - Glucose    Lipid screening  - Lipid panel reflex to direct LDL Fasting    Need for hepatitis C screening test  - Hepatitis C antibody    Birth control counseling  Renewed OCP.  Educated on indications and side effects including increased risk of blood clots.  - norgestim-eth estrad triphasic (ORTHO TRI-CYCLEN) 0.18/0.215/0.25 MG-35 MCG tablet  Dispense: 84 tablet; Refill: 3      Subjective:     Iman is a 26 year old female presenting to the clinic for a female physical.     LMP: 3 weeks ago, regular once/month   Hx of abnormal pap smear: none   Last pap smear: 1/22/21 normal   Perform self-breast exams: occasionally   Vaginal discharge or irritation: none  Sexually active: not currently, single   Contraception: ocp  Concerns for STDs: none  Previous pregnancies:none     Answers for HPI/ROS submitted by the patient on 1/28/2022  Frequency of exercise:: 1 day/week  Getting at least 3 servings of Calcium per day:: Yes  Diet:: Regular (no restrictions)  Taking medications regularly:: Yes  Medication side effects:: None  Bi-annual eye exam:: NO  Dental care twice a year:: NO  Sleep apnea or symptoms of sleep apnea:: None  Additional concerns today:: No  Duration of exercise:: 15-30 minutes    She is feeling well today and has no other concerns.     Review of systems:  I performed a 10 point review of systems.  All pertinent positives and negatives are noted in the HPI. All others are negative.     No Known Allergies    Current Outpatient Medications   Medication     norgestimate-ethinyl estradioL (ORTHO TRI-CYCLEN) 0.18/0.215/0.25 mg-35 mcg (28) tablet     tobramycin (TOBREX) 0.3 % ophthalmic solution     No current facility-administered medications for this visit.       Social History  "    Socioeconomic History     Marital status: Single     Spouse name: Not on file     Number of children: Not on file     Years of education: Not on file     Highest education level: Not on file   Occupational History     Not on file   Tobacco Use     Smoking status: Never Smoker     Smokeless tobacco: Never Used   Substance and Sexual Activity     Alcohol use: Yes     Alcohol/week: 1.0 standard drink     Drug use: No     Sexual activity: Not on file     Comment: single   Other Topics Concern     Not on file   Social History Narrative     Not on file     Social Determinants of Health     Financial Resource Strain: Not on file   Food Insecurity: Not on file   Transportation Needs: Not on file   Physical Activity: Not on file   Stress: Not on file   Social Connections: Not on file   Intimate Partner Violence: Not on file   Housing Stability: Not on file       No past medical history on file.    Family History   Problem Relation Age of Onset     No Known Problems Mother      Diabetes Father      Diabetes Sister      Seizure Disorder Brother      Depression Sister      Anxiety Disorder Sister        Past Surgical History:   Procedure Laterality Date     APPENDECTOMY         Objective:     /60 (BP Location: Left arm, Patient Position: Sitting, Cuff Size: Adult Regular)   Pulse 60   Ht 1.645 m (5' 4.75\")   Wt 61.1 kg (134 lb 11.2 oz)   BMI 22.59 kg/m      Patient is alert, no obvious distress.   Skin: Warm, dry.  No rashes or lesions. Skin turgor rapid return.   HEENT:  Eyes normal.  Ears normal.  Nose patent, mucosa pink.  Oropharynx mucosa pink, no lesions or tonsil enlargement.   Neck:  Supple, without lymphadenopathy, bruits, JVD. Thyroid normal texture and size.    Lungs:  Clear to auscultation.  No wheezing, rales noted.  Respirations even and unlabored.   Heart:  Regular rate and rhythm.  No murmurs.   Breasts:  Normal.  No surrounding adenopathy.   Abdomen: Soft, nontender.  No organomegaly.  Bowel " sounds normoactive.  No guarding or masses noted.   : deferred  Musculoskeletal:  Full ROM of extremities.  Muscle strength equal +5/5.   Neurological:  Cranial nerves 2-12 intact.

## 2022-01-31 LAB — HCV AB SERPL QL IA: NEGATIVE

## 2022-09-10 ENCOUNTER — OFFICE VISIT (OUTPATIENT)
Dept: FAMILY MEDICINE | Facility: CLINIC | Age: 27
End: 2022-09-10
Payer: COMMERCIAL

## 2022-09-10 VITALS
HEART RATE: 94 BPM | TEMPERATURE: 98.3 F | RESPIRATION RATE: 16 BRPM | BODY MASS INDEX: 23.81 KG/M2 | OXYGEN SATURATION: 98 % | SYSTOLIC BLOOD PRESSURE: 125 MMHG | WEIGHT: 142 LBS | DIASTOLIC BLOOD PRESSURE: 89 MMHG

## 2022-09-10 DIAGNOSIS — N93.8 DYSFUNCTIONAL UTERINE BLEEDING: ICD-10-CM

## 2022-09-10 DIAGNOSIS — L30.9 DERMATITIS: Primary | ICD-10-CM

## 2022-09-10 LAB — HCG UR QL: NEGATIVE

## 2022-09-10 PROCEDURE — 81025 URINE PREGNANCY TEST: CPT | Performed by: FAMILY MEDICINE

## 2022-09-10 PROCEDURE — 99213 OFFICE O/P EST LOW 20 MIN: CPT | Performed by: FAMILY MEDICINE

## 2022-09-10 RX ORDER — TRIAMCINOLONE ACETONIDE 1 MG/G
OINTMENT TOPICAL
Qty: 15 G | Refills: 0 | Status: SHIPPED | OUTPATIENT
Start: 2022-09-10 | End: 2023-02-15

## 2022-09-10 RX ORDER — MUPIROCIN 20 MG/G
OINTMENT TOPICAL 3 TIMES DAILY
Qty: 15 G | Refills: 0 | Status: SHIPPED | OUTPATIENT
Start: 2022-09-10 | End: 2023-02-15

## 2022-09-10 NOTE — PATIENT INSTRUCTIONS
Take a daily non-drowsy antihistamine like claritin or zyrtec daily , or just benadryl at night if needed for itch - to block histamine.   Stop topical benadryl    Apply mupirocin to rash 2-3 times a day after gently cleaning and pat dry. This is an antibiotic for possible infection/impetigo  Also apply topical triamcinolone (steroid) sparingly to the rash 2-3 times a day for inflammatory reaction, allergy or poison ivy type process or bug bite.     If not improving you may buy clotrimazole 1% over the counter (antifungal) and apply 3 times daily for possible ringworm.     Recheck if worse or no better     Urine pregnancy test is negative. Consider repeat home test in 5 days.  Follow up with primary care about abnormal periods on the OCP.

## 2022-09-10 NOTE — PROGRESS NOTES
Assessment/Plan:   Dermatitis  Unusual papulovesicular rash in a Elem shape on right forearm. No known bites or poison ivy. No ticks. Not typical of ringworm. Some slight ooze and honey crusting, she thinks this is from scratching it. Consider a contact dermatitis, or bug bite, rash with secondary impetigo. Will treat with triamcinolone ointment and mupirocin. Stop topical benadryl but may take orally at night if needed for itch. Claritin in the day. In no improvement then may try clotrimazole or return to recheck.   - mupirocin (BACTROBAN) 2 % external ointment; Apply topically 3 times daily  Dispense: 15 g; Refill: 0  - triamcinolone (KENALOG) 0.1 % external ointment; Apply sparingly 2-3 times a day  Dispense: 15 g; Refill: 0    Dysfunctional uterine bleeding  Abnormal vaginal bleeding while on OCPs. No misses. UPT obtained and is negative. Continue OCPs. Repeat UPT at home if bleeding persists or if next periods is also abnormal. Follow up with primary care if persistent. May need change of OCP.   - HCG qualitative urine    I discussed red flag symptoms, return precautions to clinic/ER and follow up care with patient/parent.  Expected clinical course, symptomatic cares advised. Questions answered. Patient/parent amenable with plan.    Take a daily non-drowsy antihistamine like claritin or zyrtec daily , or just benadryl at night if needed for itch - to block histamine.   Stop topical benadryl    Apply mupirocin to rash 2-3 times a day after gently cleaning and pat dry. This is an antibiotic for possible infection/impetigo  Also apply topical triamcinolone (steroid) sparingly to the rash 2-3 times a day for inflammatory reaction, allergy or poison ivy type process or bug bite.     If not improving you may buy clotrimazole 1% over the counter (antifungal) and apply 3 times daily for possible ringworm.     Recheck if worse or no better     Urine pregnancy test is negative. Consider repeat home test in 5  days.  Follow up with primary care about abnormal periods on the OCP.    Subjective:     Iman Siddiqui is a 27 year old female who presents for evaluation of a rash on her arm and concerns regarding abnormal period.   Two days ago she noted a rash on her right forearm that is itchy. It is a [place] of small red bumps. She wonders if it is ringworm.   She has had some outdoor exposure but no known ticks or poison ivy. No other known exposures.   She has applied topical benadryl with some benefit for itch.   Her other concern today is abnormal vaginal bleeding. Her LMP came early on 8/28/22. A couple weeks early - she is due next Wednesday. She is taking OCPs and has not missed any.   The bleeding is light, associated with low back ache typical of her menses.   No pelvic pain or vaginal discharge. No urinary symptoms. No N/V/D. No recent antibiotics.     No Known Allergies     Current Outpatient Medications   Medication     mupirocin (BACTROBAN) 2 % external ointment     norgestim-eth estrad triphasic (ORTHO TRI-CYCLEN) 0.18/0.215/0.25 MG-35 MCG tablet     triamcinolone (KENALOG) 0.1 % external ointment     No current facility-administered medications for this visit.     Patient Active Problem List   Diagnosis     Uses birth control       Objective:     /89   Pulse 94   Temp 98.3  F (36.8  C) (Oral)   Resp 16   Wt 64.4 kg (142 lb)   LMP 08/29/2022   SpO2 98%   Breastfeeding No   BMI 23.81 kg/m      Physical    General Appearance: Alert, pleasant, no distress  Head: Normocephalic, without obvious abnormality, atraumatic  Eyes: Conjunctivae are normal  Lungs:  respirations unlabored  Extremities: No lower extremity edema. No right forearm or hand swelling, redness warmth or redness. Normal wrist pulses and cap refill, normal sensation.   Skin: there is a 1cm [place] of papulovesicular bumps with clear center on her right forearm. No surrounding induration or redness ot suggest cellulitis. Mild honey  crusting on them. No other rash.   Psychiatric: Patient has a normal mood and affect.     Results for orders placed or performed in visit on 09/10/22   HCG qualitative urine     Status: Normal   Result Value Ref Range    hCG Urine Qualitative Negative Negative       This note has been dictated in part using voice recognition software.  Any grammatical or context distortions are unintentional and inherent to the software.  Please feel free to contact me directly for clarification if needed.

## 2022-09-12 ENCOUNTER — MYC MEDICAL ADVICE (OUTPATIENT)
Dept: FAMILY MEDICINE | Facility: CLINIC | Age: 27
End: 2022-09-12

## 2022-09-13 NOTE — TELEPHONE ENCOUNTER
Received a call from patient regarding possible appointment on Friday.     Pt is really wanting to be seen this week by Gina was hopeful for Friday afternoon? Would it be ok to overbook the 2:50pm on 9/16?     Please advise and inform patient if this time does not work, otherwise please send [id number] message confirming time on Friday.

## 2022-10-02 ENCOUNTER — HEALTH MAINTENANCE LETTER (OUTPATIENT)
Age: 27
End: 2022-10-02

## 2023-02-14 ASSESSMENT — ENCOUNTER SYMPTOMS
EYE PAIN: 0
HEMATURIA: 0
DIARRHEA: 0
DIZZINESS: 0
ABDOMINAL PAIN: 0
HEARTBURN: 0
NAUSEA: 0
ARTHRALGIAS: 0
HEMATOCHEZIA: 0
CHILLS: 0
NERVOUS/ANXIOUS: 0
COUGH: 0
JOINT SWELLING: 0
FEVER: 0
WEAKNESS: 0
MYALGIAS: 0
SORE THROAT: 0
SHORTNESS OF BREATH: 0
BREAST MASS: 0
HEADACHES: 0
PALPITATIONS: 0
DYSURIA: 0
FREQUENCY: 0
PARESTHESIAS: 0
CONSTIPATION: 0

## 2023-02-15 ENCOUNTER — OFFICE VISIT (OUTPATIENT)
Dept: FAMILY MEDICINE | Facility: CLINIC | Age: 28
End: 2023-02-15
Payer: COMMERCIAL

## 2023-02-15 VITALS
WEIGHT: 158.2 LBS | BODY MASS INDEX: 26.36 KG/M2 | HEIGHT: 65 IN | TEMPERATURE: 98.6 F | DIASTOLIC BLOOD PRESSURE: 82 MMHG | HEART RATE: 87 BPM | SYSTOLIC BLOOD PRESSURE: 118 MMHG | OXYGEN SATURATION: 99 %

## 2023-02-15 DIAGNOSIS — Z30.09 BIRTH CONTROL COUNSELING: ICD-10-CM

## 2023-02-15 DIAGNOSIS — Z13.1 DIABETES MELLITUS SCREENING: ICD-10-CM

## 2023-02-15 DIAGNOSIS — Z12.83 SKIN EXAM, SCREENING FOR CANCER: ICD-10-CM

## 2023-02-15 DIAGNOSIS — Z00.00 ENCOUNTER FOR ROUTINE HISTORY AND PHYSICAL EXAM IN FEMALE: Primary | ICD-10-CM

## 2023-02-15 DIAGNOSIS — Z13.220 LIPID SCREENING: ICD-10-CM

## 2023-02-15 LAB
CHOLEST SERPL-MCNC: 175 MG/DL
FASTING STATUS PATIENT QL REPORTED: YES
GLUCOSE SERPL-MCNC: 100 MG/DL (ref 70–99)
HDLC SERPL-MCNC: 78 MG/DL
HGB BLD-MCNC: 14.9 G/DL (ref 11.7–15.7)
LDLC SERPL CALC-MCNC: 88 MG/DL
NONHDLC SERPL-MCNC: 97 MG/DL
TRIGL SERPL-MCNC: 45 MG/DL

## 2023-02-15 PROCEDURE — 99395 PREV VISIT EST AGE 18-39: CPT | Performed by: NURSE PRACTITIONER

## 2023-02-15 PROCEDURE — 85018 HEMOGLOBIN: CPT | Performed by: NURSE PRACTITIONER

## 2023-02-15 PROCEDURE — 36415 COLL VENOUS BLD VENIPUNCTURE: CPT | Performed by: NURSE PRACTITIONER

## 2023-02-15 PROCEDURE — 80061 LIPID PANEL: CPT | Performed by: NURSE PRACTITIONER

## 2023-02-15 PROCEDURE — 82947 ASSAY GLUCOSE BLOOD QUANT: CPT | Performed by: NURSE PRACTITIONER

## 2023-02-15 RX ORDER — NORGESTIMATE AND ETHINYL ESTRADIOL 7DAYSX3 28
1 KIT ORAL DAILY
Qty: 84 TABLET | Refills: 3 | Status: SHIPPED | OUTPATIENT
Start: 2023-02-15 | End: 2024-05-31

## 2023-02-15 ASSESSMENT — PAIN SCALES - GENERAL: PAINLEVEL: NO PAIN (0)

## 2023-02-15 NOTE — PROGRESS NOTES
Assessment and Plan:    Encounter for routine history and physical exam in female  Recommend consuming a healthy diet and exercising.  She declines influenza vaccine and COVID booster.  - Hemoglobin    Diabetes mellitus screening  - Glucose    Lipid screening  - Lipid panel reflex to direct LDL Fasting    Birth control counseling  Renewed OCP.  Educated on its indications and side effects including increased risk of blood clots.  - norgestim-eth estrad triphasic (ORTHO TRI-CYCLEN) 0.18/0.215/0.25 MG-35 MCG tablet  Dispense: 84 tablet; Refill: 3    Skin exam, screening for cancer  Will refer to dermatology for thorough skin exam.  - Adult Dermatology Referral      Subjective:     Iman is a 27 year old female presenting to the clinic for a female physical.     LMP: 2 weeks ago, regular once/month   Hx of abnormal pap smear: none   Last pap smear: 1/22/21 normal   Perform self-breast exams: occasionally   Vaginal discharge or irritation: none  Sexually active: not currently, single   Contraception: ocp  Concerns for STDs: none  Previous pregnancies:none     Patient requests referral to dermatology for thorough skin exam.    Review of systems:  I performed a 10 point review of systems.  All pertinent positives and negatives are noted in the HPI. All others are negative.     No Known Allergies    Current Outpatient Medications   Medication     norgestim-eth estrad triphasic (ORTHO TRI-CYCLEN) 0.18/0.215/0.25 MG-35 MCG tablet     No current facility-administered medications for this visit.       Social History     Socioeconomic History     Marital status: Single     Spouse name: Not on file     Number of children: Not on file     Years of education: Not on file     Highest education level: Not on file   Occupational History     Not on file   Tobacco Use     Smoking status: Never     Smokeless tobacco: Never   Vaping Use     Vaping Use: Never used   Substance and Sexual Activity     Alcohol use: Yes     Alcohol/week:  "1.0 standard drink     Drug use: No     Sexual activity: Not Currently     Partners: Male     Birth control/protection: Abstinence, Condom, Pill     Comment: single   Other Topics Concern     Parent/sibling w/ CABG, MI or angioplasty before 65F 55M? No   Social History Narrative     Not on file     Social Determinants of Health     Financial Resource Strain: Not on file   Food Insecurity: Not on file   Transportation Needs: Not on file   Physical Activity: Not on file   Stress: Not on file   Social Connections: Not on file   Intimate Partner Violence: Not on file   Housing Stability: Not on file       No past medical history on file.    Family History   Problem Relation Age of Onset     No Known Problems Mother      Diabetes Father      Diabetes Sister      Seizure Disorder Brother      Depression Sister      Anxiety Disorder Sister        Past Surgical History:   Procedure Laterality Date     APPENDECTOMY         Objective:     /82 (BP Location: Left arm, Patient Position: Sitting, Cuff Size: Adult Regular)   Pulse 87   Temp 98.6  F (37  C) (Temporal)   Ht 1.654 m (5' 5.12\")   Wt 71.8 kg (158 lb 3.2 oz)   LMP 01/30/2023 (Approximate)   SpO2 99%   BMI 26.23 kg/m      Patient is alert, no obvious distress.   Skin: Warm, dry.  No rashes or lesions. Skin turgor rapid return.   HEENT:  Eyes normal.  Ears normal.  Nose patent, mucosa pink.  Oropharynx mucosa pink, no lesions or tonsil enlargement.   Neck:  Supple, without lymphadenopathy, bruits, JVD. Thyroid normal texture and size.    Lungs:  Clear to auscultation.  No wheezing, rales noted.  Respirations even and unlabored.   Heart:  Regular rate and rhythm.  No murmurs.   Breasts:  Deferred per patient   Abdomen: Soft, nontender.  No organomegaly.  Bowel sounds normoactive.  No guarding or masses noted.   :  deferred  Musculoskeletal:  Full ROM of extremities.  Muscle strength equal +5/5.   Neurological:  Cranial nerves 2-12 intact.          Answers " for HPI/ROS submitted by the patient on 2/14/2023  Frequency of exercise:: 2-3 days/week  Getting at least 3 servings of Calcium per day:: Yes  Diet:: Regular (no restrictions)  Taking medications regularly:: Yes  Medication side effects:: None  Bi-annual eye exam:: NO  Dental care twice a year:: NO  Sleep apnea or symptoms of sleep apnea:: None  abdominal pain: No  Blood in stool: No  Blood in urine: No  chest pain: No  chills: No  congestion: No  constipation: No  cough: No  diarrhea: No  dizziness: No  ear pain: No  eye pain: No  nervous/anxious: No  fever: No  frequency: No  genital sores: No  headaches: No  hearing loss: No  heartburn: No  arthralgias: No  joint swelling: No  peripheral edema: No  mood changes: No  myalgias: No  nausea: No  dysuria: No  palpitations: No  Skin sensation changes: No  sore throat: No  urgency: No  rash: No  shortness of breath: No  visual disturbance: No  weakness: No  pelvic pain: No  vaginal bleeding: No  vaginal discharge: No  tenderness: No  breast mass: No  breast discharge: No  Additional concerns today:: No  Duration of exercise:: 15-30 minutes

## 2023-03-24 ENCOUNTER — TRANSFERRED RECORDS (OUTPATIENT)
Dept: HEALTH INFORMATION MANAGEMENT | Facility: CLINIC | Age: 28
End: 2023-03-24
Payer: COMMERCIAL

## 2024-05-18 ENCOUNTER — HEALTH MAINTENANCE LETTER (OUTPATIENT)
Age: 29
End: 2024-05-18

## 2024-05-18 SDOH — HEALTH STABILITY: PHYSICAL HEALTH
ON AVERAGE, HOW MANY DAYS PER WEEK DO YOU ENGAGE IN MODERATE TO STRENUOUS EXERCISE (LIKE A BRISK WALK)?: PATIENT DECLINED

## 2024-05-18 ASSESSMENT — SOCIAL DETERMINANTS OF HEALTH (SDOH): HOW OFTEN DO YOU GET TOGETHER WITH FRIENDS OR RELATIVES?: ONCE A WEEK

## 2024-05-24 PROBLEM — Z00.00 PREVENTATIVE HEALTH CARE: Status: ACTIVE | Noted: 2024-05-24

## 2024-05-24 PROBLEM — Z30.41 ENCOUNTER FOR SURVEILLANCE OF CONTRACEPTIVE PILLS: Status: ACTIVE | Noted: 2018-02-20

## 2024-05-27 ASSESSMENT — SOCIAL DETERMINANTS OF HEALTH (SDOH): HOW OFTEN DO YOU GET TOGETHER WITH FRIENDS OR RELATIVES?: ONCE A WEEK

## 2024-05-31 ENCOUNTER — OFFICE VISIT (OUTPATIENT)
Dept: FAMILY MEDICINE | Facility: CLINIC | Age: 29
End: 2024-05-31
Payer: COMMERCIAL

## 2024-05-31 VITALS
RESPIRATION RATE: 16 BRPM | SYSTOLIC BLOOD PRESSURE: 124 MMHG | OXYGEN SATURATION: 100 % | DIASTOLIC BLOOD PRESSURE: 87 MMHG | HEART RATE: 80 BPM | BODY MASS INDEX: 29.67 KG/M2 | TEMPERATURE: 98.4 F | WEIGHT: 178.1 LBS | HEIGHT: 65 IN

## 2024-05-31 DIAGNOSIS — Z00.00 PREVENTATIVE HEALTH CARE: Primary | ICD-10-CM

## 2024-05-31 DIAGNOSIS — E66.3 OVERWEIGHT (BMI 25.0-29.9): ICD-10-CM

## 2024-05-31 DIAGNOSIS — Z13.1 SCREENING FOR DIABETES MELLITUS: ICD-10-CM

## 2024-05-31 DIAGNOSIS — R73.09 ELEVATED GLUCOSE: ICD-10-CM

## 2024-05-31 DIAGNOSIS — Z00.00 ROUTINE GENERAL MEDICAL EXAMINATION AT A HEALTH CARE FACILITY: ICD-10-CM

## 2024-05-31 DIAGNOSIS — Z12.4 CERVICAL CANCER SCREENING: ICD-10-CM

## 2024-05-31 DIAGNOSIS — Z30.41 ENCOUNTER FOR SURVEILLANCE OF CONTRACEPTIVE PILLS: ICD-10-CM

## 2024-05-31 DIAGNOSIS — Z13.220 SCREENING, LIPID: ICD-10-CM

## 2024-05-31 PROCEDURE — 80061 LIPID PANEL: CPT | Performed by: PHYSICIAN ASSISTANT

## 2024-05-31 PROCEDURE — 82947 ASSAY GLUCOSE BLOOD QUANT: CPT | Performed by: PHYSICIAN ASSISTANT

## 2024-05-31 PROCEDURE — 99395 PREV VISIT EST AGE 18-39: CPT | Performed by: PHYSICIAN ASSISTANT

## 2024-05-31 PROCEDURE — G0145 SCR C/V CYTO,THINLAYER,RESCR: HCPCS | Performed by: PHYSICIAN ASSISTANT

## 2024-05-31 PROCEDURE — 36415 COLL VENOUS BLD VENIPUNCTURE: CPT | Performed by: PHYSICIAN ASSISTANT

## 2024-05-31 RX ORDER — NORGESTIMATE AND ETHINYL ESTRADIOL 7DAYSX3 28
1 KIT ORAL DAILY
Qty: 84 TABLET | Refills: 3 | Status: SHIPPED | OUTPATIENT
Start: 2024-05-31

## 2024-05-31 NOTE — PATIENT INSTRUCTIONS
"Preventive Care Advice   This is general advice we often give to help people stay healthy. Your care team may have specific advice just for you. Please talk to your care team about your own preventive care needs.  Lifestyle  Exercise at least 150 minutes each week (30 minutes a day, 5 days a week).  Do muscle strengthening activities 2 days a week. These help control your weight and prevent disease.  No smoking.  Wear sunscreen to prevent skin cancer.  Have your home tested for radon every 2 to 5 years. Radon is a colorless, odorless gas that can harm your lungs. To learn more, go to www.health.[facility].[place]. and search for \"Radon in Homes.\"  Keep guns unloaded and locked up in a safe place like a safe or gun vault, or, use a gun lock and hide the keys. Always lock away bullets separately. To learn more, visit [employer].gov and search for \"safe gun storage.\"  Nutrition  Eat 5 or more servings of fruits and vegetables each day.  Try wheat bread, brown rice and whole grain pasta (instead of white bread, rice, and pasta).  Get enough calcium and vitamin D. Check the label on foods and aim for 100% of the RDA (recommended daily allowance).  Regular exams  Have a dental exam and cleaning every 6 months.  See your health care team every year to talk about:  Any changes in your health.  Any medicines your care team has prescribed.  Preventive care, family planning, and ways to prevent chronic diseases.  Shots (vaccines)   HPV shots (up to age 26), if you've never had them before.  Hepatitis B shots (up to age 59), if you've never had them before.  COVID-19 shot: Get this shot when it's due.  Flu shot: Get a flu shot every year.  Tetanus shot: Get a tetanus shot every 10 years.  Pneumococcal, hepatitis A, and RSV shots: Ask your care team if you need these based on your risk.  Shingles shot (for age 50 and up).  General health tests  Diabetes screening:  Starting at age 35, Get screened for diabetes at least every 3 years.  If " you are younger than age 35, ask your care team if you should be screened for diabetes.  Cholesterol test: At age 39, start having a cholesterol test every 5 years, or more often if advised.  Bone density scan (DEXA): At age 50, ask your care team if you should have this scan for osteoporosis (brittle bones).  Hepatitis C: Get tested at least once in your life.  Abdominal aortic aneurysm screening: Talk to your doctor about having this screening if you:  Have ever smoked; and  Are biologically male; and  Are between the ages of 65 and 75.  STIs (sexually transmitted infections)  Before age 24: Ask your care team if you should be screened for STIs.  After age 24: Get screened for STIs if you're at risk. You are at risk for STIs (including HIV) if:  You are sexually active with more than one person.  You don't use condoms every time.  You or a partner was diagnosed with a sexually transmitted infection.  If you are at risk for HIV, ask about PrEP medicine to prevent HIV.  Get tested for HIV at least once in your life, whether you are at risk for HIV or not.  Cancer screening tests  Cervical cancer screening: If you have a cervix, begin getting regular cervical cancer screening tests at age 21. Most people who have regular screenings with normal results can stop after age 65. Talk about this with your provider.  Breast cancer scan (mammogram): If you've ever had breasts, begin having regular mammograms starting at age 40. This is a scan to check for breast cancer.  Colon cancer screening: It is important to start screening for colon cancer at age 45.  Have a colonoscopy test every 10 years (or more often if you're at risk) Or, ask your provider about stool tests like a FIT test every year or Cologuard test every 3 years.  To learn more about your testing options, visit: www.[employer]/036122.pdf.  For help making a decision, visit: tc/jt02050.  Prostate cancer screening test: If you have a prostate and are age 55  to 69, ask your provider if you would benefit from a yearly prostate cancer screening test.  Lung cancer screening: If you are a current or former smoker age 50 to 80, ask your care team if ongoing lung cancer screenings are right for you.  For informational purposes only. Not to replace the advice of your health care provider. Copyright   2023 [employer]. All rights reserved. Clinically reviewed by the [facility]. duuin 120558 - REV 04/24.    Learning About Stress  What is stress?     Stress is your body's response to a hard situation. Your body can have a physical, emotional, or mental response. Stress is a fact of life for most people, and it affects everyone differently. What causes stress for you may not be stressful for someone else.  A lot of things can cause stress. You may feel stress when you go on a job interview, take a test, or run a race. This kind of short-term stress is normal and even useful. It can help you if you need to work hard or react quickly. For example, stress can help you finish an important job on time.  Long-term stress is caused by ongoing stressful situations or events. Examples of long-term stress include long-term health problems, ongoing problems at work, or conflicts in your family. Long-term stress can harm your health.  How does stress affect your health?  When you are stressed, your body responds as though you are in danger. It makes hormones that speed up your heart, make you breathe faster, and give you a burst of energy. This is called the fight-or-flight stress response. If the stress is over quickly, your body goes back to normal and no harm is done.  But if stress happens too often or lasts too long, it can have bad effects. Long-term stress can make you more likely to get sick, and it can make symptoms of some diseases worse. If you tense up when you are stressed, you may develop neck, shoulder, or low back pain. Stress is  linked to high blood pressure and heart disease.  Stress also harms your emotional health. It can make you nur, tense, or depressed. Your relationships may suffer, and you may not do well at work or school.  What can you do to manage stress?  You can try these things to help manage stress:   Do something active. Exercise or activity can help reduce stress. Walking is a great way to get started. Even everyday activities such as housecleaning or yard work can help.  Try yoga or shi chi. These techniques combine exercise and meditation. You may need some training at first to learn them.  Do something you enjoy. For example, listen to music or go to a movie. Practice your hobby or do volunteer work.  Meditate. This can help you relax, because you are not worrying about what happened before or what may happen in the future.  Do guided imagery. Imagine yourself in any setting that helps you feel calm. You can use online videos, books, or a teacher to guide you.  Do breathing exercises. For example:  From a standing position, bend forward from the waist with your knees slightly bent. Let your arms dangle close to the floor.  Breathe in slowly and deeply as you return to a standing position. Roll up slowly and lift your head last.  Hold your breath for just a few seconds in the standing position.  Breathe out slowly and bend forward from the waist.  Let your feelings out. Talk, laugh, cry, and express anger when you need to. Talking with supportive friends or family, a counselor, or a thomas leader about your feelings is a healthy way to relieve stress. Avoid discussing your feelings with people who make you feel worse.  Write. It may help to write about things that are bothering you. This helps you find out how much stress you feel and what is causing it. When you know this, you can find better ways to cope.  What can you do to prevent stress?  You might try some of these things to help prevent stress:  Manage your time.  "This helps you find time to do the things you want and need to do.  Get enough sleep. Your body recovers from the stresses of the day while you are sleeping.  Get support. Your family, friends, and community can make a difference in how you experience stress.  Limit your news feed. Avoid or limit time on social media or news that may make you feel stressed.  Do something active. Exercise or activity can help reduce stress. Walking is a great way to get started.  Where can you learn more?  Go to https://www.[employer].net/patiented  Enter N032 in the search box to learn more about \"Learning About Stress.\"  Current as of: October 24, 2023               Content Version: 14.0    4981-8255 "[employer].   Care instructions adapted under license by your healthcare professional. If you have questions about a medical condition or this instruction, always ask your healthcare professional. "[employer] disclaims any warranty or liability for your use of this information.      Learning About Alcohol Use Disorder  What is alcohol use disorder?  Alcohol use disorder means that a person drinks alcohol even though it causes harm to themselves or others. It can range from mild to severe. The more symptoms of this disorder you have, the more severe it may be. People who have it may find it hard to control their use of alcohol.  People who have this disorder may argue with others about how much they're drinking. Their job may be affected because of drinking. They may drink when it's dangerous or illegal, such as when they drive. They also may have a strong need, or craving, to drink. They may feel like they must drink just to get by. Their drinking may increase their risk of getting hurt or being in a car crash.  Over time, drinking too much alcohol may cause health problems. These may include high blood pressure, liver problems, or problems with digestion.  What are the symptoms?  Maybe you've wondered about " your alcohol habits or how to tell if your drinking is becoming a problem.  Here are some of the symptoms of alcohol use disorder. You may have it if you have two or more of the following symptoms:  You drink larger amounts of alcohol than you ever meant to. Or you've been drinking for a longer time than you ever meant to.  You can't cut down or control your use. Or you constantly wish you could cut down.  You spend a lot of time getting or drinking alcohol or recovering from its effects.  You have strong cravings for alcohol.  You can no longer do your main jobs at work, at school, or at home.  You keep drinking alcohol, even though your use hurts your relationships.  You have stopped doing important activities because of your alcohol use.  You drink alcohol in situations where doing so is dangerous.  You keep drinking alcohol even though you know it's causing health problems.  You need more and more alcohol to get the same effect, or you get less effect from the same amount over time. This is called tolerance.  You have uncomfortable symptoms when you stop drinking alcohol or use less. This is called withdrawal.  Alcohol use disorder can range from mild to severe. The more symptoms you have, the more severe the disorder may be.  You might not realize that your drinking is a problem. You might not drink large amounts when you drink. Or you might go for days or weeks between drinking episodes. But even if you don't drink very often, your drinking could still be harmful and put you at risk.  How is alcohol use disorder treated?  Getting help is up to you. But you don't have to do it alone. There are many people and kinds of treatments that can help.  Treatment for alcohol use disorder can include:  Group therapy, one or more types of counseling, and alcohol education.  Medicines that help to:  Reduce withdrawal symptoms and help you safely stop drinking.  Reduce cravings for alcohol.  Support groups. These groups  "include Alcoholics Anonymous and SMART Recovery (Self-Management and Recovery Training).  Some people are able to stop or cut back on drinking with help from a counselor. People who have moderate to severe alcohol use disorder may need medical treatment. They may need to stay in a hospital or treatment center.  You may have a treatment team to help you. This team may include a psychologist or psychiatrist, counselors, doctors, social workers, nurses, and a . A  helps plan and manage your treatment.  Follow-up care is a key part of your treatment and safety. Be sure to make and go to all appointments, and call your doctor if you are having problems. It's also a good idea to know your test results and keep a list of the medicines you take.  Where can you learn more?  Go to https://www.[employer].net/patiented  Enter H758 in the search box to learn more about \"Learning About Alcohol Use Disorder.\"  Current as of: November 15, 2023               Content Version: 14.0    1390-3290 [email or web address].   Care instructions adapted under license by your healthcare professional. If you have questions about a medical condition or this instruction, always ask your healthcare professional. [employer] disclaims any warranty or liability for your use of this information.      "

## 2024-05-31 NOTE — PROGRESS NOTES
"Preventive Care Visit  [facility]  Ania Shin PA-C, Family Medicine  May 31, 2024    Assessment & Plan   Problem List Items Addressed This Visit       Encounter for surveillance of contraceptive pills     Refilled orthotricyclen today.  Doing well on medication.         Preventative health care - Primary     Contraception: Oral contraceptive.  Std testing desired:     Declined.  Pap:    Due.  Mammogram:   Not indicated until age 40.  Colon cancer screen:    Not indicated until age 45.  Immunizations:   Up-to-date. Declines Covid.  Lipids:    Will check today.  Glucose:    Will check today.  Dexa:     Not indicated.         Overweight (BMI 25.0-29.9)     BMP 29.  She has a goal to increase physical activity this summer.  Healthy diet and regular exercise encouraged.            Other Visit Diagnoses       Routine general medical examination at a health care facility        Cervical cancer screening        Relevant Orders    Pap Screen Reflex to HPV if ASCUS - Recommended Age 25 - 29 Years    Screening for diabetes mellitus        Relevant Orders    Glucose    Screening, lipid        Relevant Orders    Lipid Profile (Chol, Trig, HDL, LDL calc)            BMI  Estimated body mass index is 29.64 kg/m  as calculated from the following:    Height as of this encounter: 1.651 m (5' 5\").    Weight as of this encounter: 80.8 kg (178 lb 1.6 oz).       Counseling  Appropriate preventive services were discussed with this patient, including applicable screening as appropriate for fall prevention, nutrition, physical activity, Tobacco-use cessation, weight loss and cognition.  Checklist reviewing preventive services available has been given to the patient.  Reviewed patient's diet, addressing concerns and/or questions.   The patient was instructed to see the dentist every 6 months.   The patient reports drinking more than one alcoholic drink per day and sometimes engages in binge or excessive drinking. " The patient was counseled and given information about possible harmful effects of excessive alcohol intake as well as where to get help for alcohol problems.     Eliezer Valerio is a 28 year old, presenting for the following:  Physical        5/31/2024     7:40 AM   Additional Questions   Roomed by ac   Accompanied by self        Health Care Directive  Patient does not have a Health Care Directive or Living Will: Discussed advance care planning with patient; information given to patient to review.    HPI        5/27/2024   General Health   How would you rate your overall physical health? Good   Feel stress (tense, anxious, or unable to sleep) To some extent   (!) STRESS CONCERN      5/27/2024   Nutrition   Three or more servings of calcium each day? (!) I DON'T KNOW   Diet: Regular (no restrictions)   How many servings of fruit and vegetables per day? (!) I DON'T KNOW   How many sweetened beverages each day? 0-1         5/27/2024   Exercise   Days per week of moderate/strenous exercise Patient declined         5/27/2024   Social Factors   Frequency of gathering with friends or relatives Once a week   Worry food won't last until get money to buy more No   Food not last or not have enough money for food? No   Do you have housing?  Yes   Are you worried about losing your housing? No   Lack of transportation? No   Unable to get utilities (heat,electricity)? No         5/27/2024   Dental   Dentist two times every year? (!) NO         5/18/2024   TB Screening   Were you born outside of the [place]? No         Today's PHQ-2 Score:       5/31/2024     7:37 AM   PHQ-2 ( 1999 [employer])   Q1: Little interest or pleasure in doing things 1   Q2: Feeling down, depressed or hopeless 1   PHQ-2 Score 2   Q1: Little interest or pleasure in doing things Several days   Q2: Feeling down, depressed or hopeless Several days   PHQ-2 Score 2           5/27/2024   Substance Use   Alcohol more than 3/day or more than 7/wk Yes   How often do  you have a drink containing alcohol 2 to 3 times a week   How many alcohol drinks on typical day 3 or 4   How often do you have 5+ drinks at one occasion Monthly   Audit 2/3 Score 3   How often not able to stop drinking once started Never   How often failed to do what normally expected Never   How often needed first drink in am after a heavy drinking session Never   How often feeling of guilt or remorse after drinking Never   How often unable to remember what happened the night before Never   Have you or someone else been injured because of your drinking No   Has anyone been concerned or suggested you cut down on drinking No   TOTAL SCORE - AUDIT 6   Do you use any other substances recreationally? (!) DECLINE     Social History     Tobacco Use    Smoking status: Never    Smokeless tobacco: Never   Vaping Use    Vaping status: Never Used   Substance Use Topics    Alcohol use: Yes     Alcohol/week: 1.0 standard drink of alcohol    Drug use: No             5/27/2024   STI Screening   New sexual partner(s) since last STI/HIV test? No     History of abnormal Pap smear: No - age 21-29 PAP every 3 years recommended        Latest Ref Rng & Units 1/22/2021     4:07 PM 10/24/2017     4:40 PM   PAP / HPV   PAP Negative for squamous intraepithelial lesion or malignancy. Negative for squamous intraepithelial lesion or malignancy  Electronically signed by Ananya Irwin CT (ASCP) on 1/26/2021 at  1:25 PM    Negative for squamous intraepithelial lesion or malignancy  Electronically signed by Daija Vasquez CT (ASCP) on 10/30/2017 at 12:56 PM              5/27/2024   Contraception/Family Planning   Questions about contraception or family planning (!) YES Oral contraceptive refilled today     Reviewed and updated as needed this visit by Provider   Tobacco  Allergies  Meds  Problems  Med Hx  Surg Hx  Fam Hx             Objective    Exam  /87 (BP Location: Left arm, Patient Position: Sitting, Cuff Size: Adult  "Large)   Pulse 80   Temp 98.4  F (36.9  C) (Oral)   Resp 16   Ht 1.651 m (5' 5\")   Wt 80.8 kg (178 lb 1.6 oz)   LMP 05/20/2024 (Exact Date)   SpO2 100%   BMI 29.64 kg/m     Estimated body mass index is 29.64 kg/m  as calculated from the following:    Height as of this encounter: 1.651 m (5' 5\").    Weight as of this encounter: 80.8 kg (178 lb 1.6 oz).    Physical Exam  Vitals and nursing note reviewed.   Constitutional:       Appearance: Normal appearance. She is normal weight.   HENT:      Head: Normocephalic and atraumatic.      Right Ear: Tympanic membrane, ear canal and external ear normal.      Left Ear: Tympanic membrane, ear canal and external ear normal.      Mouth/Throat:      Mouth: Mucous membranes are moist.      Pharynx: Oropharynx is clear.   Eyes:      Conjunctiva/sclera: Conjunctivae normal.   Cardiovascular:      Rate and Rhythm: Normal rate and regular rhythm.      Pulses: Normal pulses.      Heart sounds: Normal heart sounds.   Pulmonary:      Effort: Pulmonary effort is normal.      Breath sounds: Normal breath sounds.   Abdominal:      General: Abdomen is flat.      Palpations: Abdomen is soft.   Genitourinary:     General: Normal vulva.      Vagina: No vaginal discharge.   Musculoskeletal:      Cervical back: Normal range of motion and neck supple.   Skin:     General: Skin is warm and dry.   Neurological:      General: No focal deficit present.      Mental Status: She is alert and oriented to person, place, and time.   Psychiatric:         Mood and Affect: Mood normal.         Behavior: Behavior normal.         Thought Content: Thought content normal.         Judgment: Judgment normal.       Signed Electronically by: Ania Shin PA-C    "

## 2024-05-31 NOTE — ASSESSMENT & PLAN NOTE
Contraception: Oral contraceptive.  Std testing desired:     Declined.  Pap:    Due.  Mammogram:   Not indicated until age 40.  Colon cancer screen:    Not indicated until age 45.  Immunizations:   Up-to-date. Declines Covid.  Lipids:    Will check today.  Glucose:    Will check today.  Dexa:     Not indicated.

## 2024-05-31 NOTE — ASSESSMENT & PLAN NOTE
BMP 29.  She has a goal to increase physical activity this summer.  Healthy diet and regular exercise encouraged.

## 2024-06-01 LAB
CHOLEST SERPL-MCNC: 175 MG/DL
FASTING STATUS PATIENT QL REPORTED: YES
FASTING STATUS PATIENT QL REPORTED: YES
GLUCOSE SERPL-MCNC: 109 MG/DL (ref 70–99)
HDLC SERPL-MCNC: 59 MG/DL
LDLC SERPL CALC-MCNC: 89 MG/DL
NONHDLC SERPL-MCNC: 116 MG/DL
TRIGL SERPL-MCNC: 137 MG/DL

## 2024-06-05 LAB
BKR LAB AP GYN ADEQUACY: NORMAL
BKR LAB AP GYN INTERPRETATION: NORMAL
BKR LAB AP HPV REFLEX: NORMAL
BKR LAB AP LMP: NORMAL
BKR LAB AP PREVIOUS ABNORMAL: NORMAL
PATH REPORT.COMMENTS IMP SPEC: NORMAL
PATH REPORT.COMMENTS IMP SPEC: NORMAL
PATH REPORT.RELEVANT HX SPEC: NORMAL

## 2025-05-01 ENCOUNTER — PATIENT OUTREACH (OUTPATIENT)
Dept: CARE COORDINATION | Facility: CLINIC | Age: 30
End: 2025-05-01
Payer: COMMERCIAL

## 2025-05-15 ENCOUNTER — PATIENT OUTREACH (OUTPATIENT)
Dept: CARE COORDINATION | Facility: CLINIC | Age: 30
End: 2025-05-15
Payer: COMMERCIAL

## 2025-07-20 ENCOUNTER — HEALTH MAINTENANCE LETTER (OUTPATIENT)
Age: 30
End: 2025-07-20